# Patient Record
Sex: MALE | Race: WHITE | NOT HISPANIC OR LATINO | Employment: FULL TIME | URBAN - METROPOLITAN AREA
[De-identification: names, ages, dates, MRNs, and addresses within clinical notes are randomized per-mention and may not be internally consistent; named-entity substitution may affect disease eponyms.]

---

## 2022-10-31 ENCOUNTER — HOSPITAL ENCOUNTER (OUTPATIENT)
Dept: LAB | Facility: MEDICAL CENTER | Age: 57
End: 2022-10-31
Attending: FAMILY MEDICINE
Payer: COMMERCIAL

## 2022-10-31 LAB
25(OH)D3 SERPL-MCNC: 24 NG/ML (ref 30–100)
ALBUMIN SERPL BCP-MCNC: 4.6 G/DL (ref 3.2–4.9)
ALBUMIN/GLOB SERPL: 1.9 G/DL
ALP SERPL-CCNC: 44 U/L (ref 30–99)
ALT SERPL-CCNC: 13 U/L (ref 2–50)
ANION GAP SERPL CALC-SCNC: 10 MMOL/L (ref 7–16)
AST SERPL-CCNC: 16 U/L (ref 12–45)
BASOPHILS # BLD AUTO: 1 % (ref 0–1.8)
BASOPHILS # BLD: 0.06 K/UL (ref 0–0.12)
BILIRUB SERPL-MCNC: 0.6 MG/DL (ref 0.1–1.5)
BUN SERPL-MCNC: 18 MG/DL (ref 8–22)
CALCIUM SERPL-MCNC: 9.8 MG/DL (ref 8.5–10.5)
CHLORIDE SERPL-SCNC: 105 MMOL/L (ref 96–112)
CHOLEST SERPL-MCNC: 262 MG/DL (ref 100–199)
CO2 SERPL-SCNC: 25 MMOL/L (ref 20–33)
CREAT SERPL-MCNC: 0.95 MG/DL (ref 0.5–1.4)
CRP SERPL HS-MCNC: 1 MG/L (ref 0–3)
EOSINOPHIL # BLD AUTO: 0.13 K/UL (ref 0–0.51)
EOSINOPHIL NFR BLD: 2.2 % (ref 0–6.9)
ERYTHROCYTE [DISTWIDTH] IN BLOOD BY AUTOMATED COUNT: 40.7 FL (ref 35.9–50)
FASTING STATUS PATIENT QL REPORTED: NORMAL
GFR SERPLBLD CREATININE-BSD FMLA CKD-EPI: 94 ML/MIN/1.73 M 2
GLOBULIN SER CALC-MCNC: 2.4 G/DL (ref 1.9–3.5)
GLUCOSE SERPL-MCNC: 100 MG/DL (ref 65–99)
HCT VFR BLD AUTO: 46.1 % (ref 42–52)
HDLC SERPL-MCNC: 51 MG/DL
HGB BLD-MCNC: 15 G/DL (ref 14–18)
IMM GRANULOCYTES # BLD AUTO: 0.01 K/UL (ref 0–0.11)
IMM GRANULOCYTES NFR BLD AUTO: 0.2 % (ref 0–0.9)
LDLC SERPL CALC-MCNC: 196 MG/DL
LYMPHOCYTES # BLD AUTO: 1.97 K/UL (ref 1–4.8)
LYMPHOCYTES NFR BLD: 33.4 % (ref 22–41)
MCH RBC QN AUTO: 29.9 PG (ref 27–33)
MCHC RBC AUTO-ENTMCNC: 32.5 G/DL (ref 33.7–35.3)
MCV RBC AUTO: 92 FL (ref 81.4–97.8)
MONOCYTES # BLD AUTO: 0.46 K/UL (ref 0–0.85)
MONOCYTES NFR BLD AUTO: 7.8 % (ref 0–13.4)
NEUTROPHILS # BLD AUTO: 3.26 K/UL (ref 1.82–7.42)
NEUTROPHILS NFR BLD: 55.4 % (ref 44–72)
NRBC # BLD AUTO: 0 K/UL
NRBC BLD-RTO: 0 /100 WBC
PLATELET # BLD AUTO: 282 K/UL (ref 164–446)
PMV BLD AUTO: 10.3 FL (ref 9–12.9)
POTASSIUM SERPL-SCNC: 4.3 MMOL/L (ref 3.6–5.5)
PROT SERPL-MCNC: 7 G/DL (ref 6–8.2)
RBC # BLD AUTO: 5.01 M/UL (ref 4.7–6.1)
SODIUM SERPL-SCNC: 140 MMOL/L (ref 135–145)
T3FREE SERPL-MCNC: 2.51 PG/ML (ref 2–4.4)
T4 FREE SERPL-MCNC: 1.16 NG/DL (ref 0.93–1.7)
TRIGL SERPL-MCNC: 75 MG/DL (ref 0–149)
TSH SERPL DL<=0.005 MIU/L-ACNC: 2.35 UIU/ML (ref 0.38–5.33)
WBC # BLD AUTO: 5.9 K/UL (ref 4.8–10.8)

## 2022-10-31 PROCEDURE — 80053 COMPREHEN METABOLIC PANEL: CPT

## 2022-10-31 PROCEDURE — 80061 LIPID PANEL: CPT

## 2022-10-31 PROCEDURE — 84443 ASSAY THYROID STIM HORMONE: CPT

## 2022-10-31 PROCEDURE — 36415 COLL VENOUS BLD VENIPUNCTURE: CPT

## 2022-10-31 PROCEDURE — 82306 VITAMIN D 25 HYDROXY: CPT

## 2022-10-31 PROCEDURE — 84439 ASSAY OF FREE THYROXINE: CPT

## 2022-10-31 PROCEDURE — 84481 FREE ASSAY (FT-3): CPT

## 2022-10-31 PROCEDURE — 85025 COMPLETE CBC W/AUTO DIFF WBC: CPT

## 2022-10-31 PROCEDURE — 86141 C-REACTIVE PROTEIN HS: CPT

## 2024-03-07 ENCOUNTER — HOSPITAL ENCOUNTER (OUTPATIENT)
Dept: LAB | Facility: MEDICAL CENTER | Age: 59
End: 2024-03-07
Attending: STUDENT IN AN ORGANIZED HEALTH CARE EDUCATION/TRAINING PROGRAM
Payer: COMMERCIAL

## 2024-03-07 LAB
25(OH)D3 SERPL-MCNC: 33 NG/ML (ref 30–100)
ALBUMIN SERPL BCP-MCNC: 4.6 G/DL (ref 3.2–4.9)
ALBUMIN/GLOB SERPL: 1.8 G/DL
ALP SERPL-CCNC: 45 U/L (ref 30–99)
ALT SERPL-CCNC: 16 U/L (ref 2–50)
ANION GAP SERPL CALC-SCNC: 11 MMOL/L (ref 7–16)
AST SERPL-CCNC: 21 U/L (ref 12–45)
BASOPHILS # BLD AUTO: 0.8 % (ref 0–1.8)
BASOPHILS # BLD: 0.05 K/UL (ref 0–0.12)
BILIRUB SERPL-MCNC: 0.5 MG/DL (ref 0.1–1.5)
BUN SERPL-MCNC: 16 MG/DL (ref 8–22)
CALCIUM ALBUM COR SERPL-MCNC: 9 MG/DL (ref 8.5–10.5)
CALCIUM SERPL-MCNC: 9.5 MG/DL (ref 8.5–10.5)
CHLORIDE SERPL-SCNC: 103 MMOL/L (ref 96–112)
CHOLEST SERPL-MCNC: 223 MG/DL (ref 100–199)
CO2 SERPL-SCNC: 25 MMOL/L (ref 20–33)
CREAT SERPL-MCNC: 0.92 MG/DL (ref 0.5–1.4)
EOSINOPHIL # BLD AUTO: 0.1 K/UL (ref 0–0.51)
EOSINOPHIL NFR BLD: 1.6 % (ref 0–6.9)
ERYTHROCYTE [DISTWIDTH] IN BLOOD BY AUTOMATED COUNT: 41.8 FL (ref 35.9–50)
FASTING STATUS PATIENT QL REPORTED: NORMAL
GFR SERPLBLD CREATININE-BSD FMLA CKD-EPI: 96 ML/MIN/1.73 M 2
GLOBULIN SER CALC-MCNC: 2.5 G/DL (ref 1.9–3.5)
GLUCOSE SERPL-MCNC: 104 MG/DL (ref 65–99)
HCT VFR BLD AUTO: 44.6 % (ref 42–52)
HDLC SERPL-MCNC: 51 MG/DL
HGB BLD-MCNC: 14.8 G/DL (ref 14–18)
IMM GRANULOCYTES # BLD AUTO: 0.02 K/UL (ref 0–0.11)
IMM GRANULOCYTES NFR BLD AUTO: 0.3 % (ref 0–0.9)
LDLC SERPL CALC-MCNC: 153 MG/DL
LYMPHOCYTES # BLD AUTO: 2.23 K/UL (ref 1–4.8)
LYMPHOCYTES NFR BLD: 36.5 % (ref 22–41)
MCH RBC QN AUTO: 30.5 PG (ref 27–33)
MCHC RBC AUTO-ENTMCNC: 33.2 G/DL (ref 32.3–36.5)
MCV RBC AUTO: 91.8 FL (ref 81.4–97.8)
MONOCYTES # BLD AUTO: 0.5 K/UL (ref 0–0.85)
MONOCYTES NFR BLD AUTO: 8.2 % (ref 0–13.4)
NEUTROPHILS # BLD AUTO: 3.21 K/UL (ref 1.82–7.42)
NEUTROPHILS NFR BLD: 52.6 % (ref 44–72)
NRBC # BLD AUTO: 0 K/UL
NRBC BLD-RTO: 0 /100 WBC (ref 0–0.2)
PLATELET # BLD AUTO: 289 K/UL (ref 164–446)
PMV BLD AUTO: 10.3 FL (ref 9–12.9)
POTASSIUM SERPL-SCNC: 4.5 MMOL/L (ref 3.6–5.5)
PROT SERPL-MCNC: 7.1 G/DL (ref 6–8.2)
RBC # BLD AUTO: 4.86 M/UL (ref 4.7–6.1)
SODIUM SERPL-SCNC: 139 MMOL/L (ref 135–145)
TRIGL SERPL-MCNC: 93 MG/DL (ref 0–149)
WBC # BLD AUTO: 6.1 K/UL (ref 4.8–10.8)

## 2024-03-07 PROCEDURE — 80061 LIPID PANEL: CPT

## 2024-03-07 PROCEDURE — 36415 COLL VENOUS BLD VENIPUNCTURE: CPT

## 2024-03-07 PROCEDURE — 82306 VITAMIN D 25 HYDROXY: CPT

## 2024-03-07 PROCEDURE — 80053 COMPREHEN METABOLIC PANEL: CPT

## 2024-03-07 PROCEDURE — 85025 COMPLETE CBC W/AUTO DIFF WBC: CPT

## 2025-01-18 ENCOUNTER — OFFICE VISIT (OUTPATIENT)
Dept: URGENT CARE | Facility: CLINIC | Age: 60
End: 2025-01-18
Payer: COMMERCIAL

## 2025-01-18 VITALS
HEART RATE: 62 BPM | HEIGHT: 71 IN | WEIGHT: 184 LBS | OXYGEN SATURATION: 98 % | RESPIRATION RATE: 16 BRPM | BODY MASS INDEX: 25.76 KG/M2 | TEMPERATURE: 97.2 F | DIASTOLIC BLOOD PRESSURE: 84 MMHG | SYSTOLIC BLOOD PRESSURE: 132 MMHG

## 2025-01-18 DIAGNOSIS — L08.9 SKIN INFECTION: ICD-10-CM

## 2025-01-18 PROCEDURE — 3079F DIAST BP 80-89 MM HG: CPT | Performed by: NURSE PRACTITIONER

## 2025-01-18 PROCEDURE — 99213 OFFICE O/P EST LOW 20 MIN: CPT | Performed by: NURSE PRACTITIONER

## 2025-01-18 PROCEDURE — 3075F SYST BP GE 130 - 139MM HG: CPT | Performed by: NURSE PRACTITIONER

## 2025-01-18 RX ORDER — MUPIROCIN 20 MG/G
1 OINTMENT TOPICAL 2 TIMES DAILY
Qty: 22 G | Refills: 0 | Status: SHIPPED | OUTPATIENT
Start: 2025-01-18 | End: 2025-01-28

## 2025-01-18 RX ORDER — SULFAMETHOXAZOLE AND TRIMETHOPRIM 800; 160 MG/1; MG/1
1 TABLET ORAL 2 TIMES DAILY
Qty: 20 TABLET | Refills: 0 | Status: SHIPPED | OUTPATIENT
Start: 2025-01-18 | End: 2025-01-28

## 2025-01-18 ASSESSMENT — FIBROSIS 4 INDEX: FIB4 SCORE: 1.05

## 2025-01-18 NOTE — PROGRESS NOTES
"Patient has consented to treatment and for use of patient information for treatment and billing purposes.  Subjective:   Low Manzano  is a 59 y.o. male who presents for Bump (3-4 bumps on chest x 4-5 days/)       HPI  Patient is here with a concern for suspected staph outbreak on chest.  He has a history of lesions requiring antibiotic treatment in the past.  He has been using athletic equipment.  Denies fever, chills, nausea, vomiting [place].  No localized pain.  ROS      CURRENT MEDICATIONS:  This patient does not have an active medication from one of the medication groupers.  Allergies:   No Known Allergies  Current Problems: Low Manzano does not have a problem list on file.  Past Surgical Hx:  No past surgical history on file.   Past Social Hx:  reports that he has never smoked. He has never used smokeless tobacco. He reports that he does not drink alcohol and does not use drugs.    Objective:   /84 (BP Location: Left arm, Patient Position: Sitting, BP Cuff Size: Adult)   Pulse 62   Temp 36.2 °C (97.2 °F) (Temporal)   Resp 16   Ht 1.803 m (5' 11\")   Wt 83.5 kg (184 lb)   SpO2 98%   BMI 25.66 kg/m²   Physical Exam  Vitals and nursing note reviewed.   Constitutional:       Appearance: Normal appearance. He is not ill-appearing.   HENT:      Right Ear: External ear normal.      Left Ear: External ear normal.      Nose: Nose normal.      Mouth/Throat:      Mouth: Mucous membranes are moist.   Eyes:      Extraocular Movements: Extraocular movements intact.      Conjunctiva/sclera: Conjunctivae normal.      Pupils: Pupils are equal, round, and reactive to light.   Cardiovascular:      Rate and Rhythm: Normal rate.   Pulmonary:      Effort: Pulmonary effort is normal.   Musculoskeletal:         General: Normal range of motion.      Cervical back: Normal range of motion and neck supple.   Skin:     General: Skin is warm and dry.      Findings: Erythema and lesion (5 small carbuncles " clustered, non vesicular) present.          Neurological:      General: No focal deficit present.      Mental Status: He is alert.       Assessment/Plan:   1. Skin infection  - mupirocin (BACTROBAN) 2 % Ointment; Apply 1 Application topically 2 times a day for 10 days.  Dispense: 22 g; Refill: 0  - sulfamethoxazole-trimethoprim (BACTRIM DS) 800-160 MG tablet; Take 1 Tablet by mouth 2 times a day for 10 days.  Dispense: 20 Tablet; Refill: 0    History and exam are consistent with a staph infection of the skin.  Mupirocin and Bactrim sent to pharmacy with instructions for use.  Instructed to wash all bedding, and apply ointment twice daily with a Band-Aid.  Red flags, RTC and ER precautions discussed, with patient confirming their understanding of  need for immediate follow-up.  Discussed medication management options, risks and benefits, and alternatives to treatment plan agreed upon. Instructed to continue  medications without changes as ordered by primary care unless aforementioned above.  Patient expresses understanding and agrees to plan of care. All questions or concerns answered. For my MDM, I have personally reviewed previous notes, and test results as pertinent to today's visit.    Please note that this dictation was created using voice recognition software. I have made every reasonable attempt to correct obvious errors,  but there may be grammar errors, and possibly content that I did not discover before finalizing the note.   This note was electronically signed by GLENNA Pinto

## 2025-01-18 NOTE — PATIENT INSTRUCTIONS
Skin Abscess    A skin abscess is an infected area on or under your skin that contains a collection of pus and other material. An abscess may also be called a furuncle, carbuncle, or boil. An abscess can occur in or on almost any part of your body.  Some abscesses break open (rupture) on their own. Most continue to get worse unless they are treated. The infection can spread deeper into the body and eventually into your blood, which can make you feel ill. Treatment usually involves draining the abscess.  What are the causes?  An abscess occurs when germs, like bacteria, pass through your skin and cause an infection. This may be caused by:  A scrape or cut on your skin.  A puncture wound through your skin, including a needle injection or insect bite.  Blocked oil or sweat glands.  Blocked and infected hair follicles.  A cyst that forms beneath your skin (sebaceous cyst) and becomes infected.  What increases the risk?  This condition is more likely to develop in people who:  Have a weak body defense system (immune system).  Have diabetes.  Have dry and irritated skin.  Get frequent injections or use illegal IV drugs.  Have a foreign body in a wound, such as a splinter.  Have problems with their lymph system or veins.  What are the signs or symptoms?  Symptoms of this condition include:  A painful, firm bump under the skin.  A bump with pus at the top. This may break through the skin and drain.  Other symptoms include:  Redness surrounding the abscess site.  Warmth.  Swelling of the lymph nodes (glands) near the abscess.  Tenderness.  A sore on the skin.  How is this diagnosed?  This condition may be diagnosed based on:  A physical exam.  Your medical history.  A sample of pus. This may be used to find out what is causing the infection.  Blood tests.  Imaging tests, such as an ultrasound, CT scan, or MRI.  How is this treated?  A small abscess that drains on its own may not need treatment. Treatment for larger abscesses  may include:  Moist heat or heat pack applied to the area several times a day.  A procedure to drain the abscess (incision and drainage).  Antibiotic medicines. For a severe abscess, you may first get antibiotics through an IV and then change to antibiotics by mouth.  Follow these instructions at home:  Medicines    Take over-the-counter and prescription medicines only as told by your health care provider.  If you were prescribed an antibiotic medicine, take it as told by your health care provider. Do not stop taking the antibiotic even if you start to feel better.  Abscess care    If you have an abscess that has not drained, apply heat to the affected area. Use the heat source that your health care provider recommends, such as a moist heat pack or a heating pad.  Place a towel between your skin and the heat source.  Leave the heat on for 20-30 minutes.  Remove the heat if your skin turns bright red. This is especially important if you are unable to feel pain, heat, or cold. You may have a greater risk of getting burned.  Follow instructions from your health care provider about how to take care of your abscess. Make sure you:  Cover the abscess with a bandage (dressing).  Change your dressing or gauze as told by your health care provider.  Wash your hands with soap and water before you change the dressing or gauze. If soap and water are not available, use hand .  Check your abscess every day for signs of a worsening infection. Check for:  More redness, swelling, or pain.  More fluid or blood.  Warmth.  More pus or a bad smell.  General instructions  To avoid spreading the infection:  Do not share personal care items, towels, or hot tubs with others.  Avoid making skin contact with other people.  Keep all follow-up visits as told by your health care provider. This is important.  Contact a health care provider if you have:  More redness, swelling, or pain around your abscess.  More fluid or blood coming from  your abscess.  Warm skin around your abscess.  More pus or a bad smell coming from your abscess.  Muscle aches.  Chills or a general ill feeling.  Get help right away if you:  Have severe pain.  See red streaks on your skin spreading away from the abscess.  See redness that spreads quickly.  Have a fever or chills.  Summary  A skin abscess is an infected area on or under your skin that contains a collection of pus and other material.  A small abscess that drains on its own may not need treatment.  Treatment for larger abscesses may include having a procedure to drain the abscess and taking an antibiotic.  This information is not intended to replace advice given to you by your health care provider. Make sure you discuss any questions you have with your health care provider.  Document Revised: 03/23/2023 Document Reviewed: 09/26/2022  [employer] Patient Education © 2023 [employer]